# Patient Record
Sex: FEMALE | Race: OTHER | HISPANIC OR LATINO | URBAN - METROPOLITAN AREA
[De-identification: names, ages, dates, MRNs, and addresses within clinical notes are randomized per-mention and may not be internally consistent; named-entity substitution may affect disease eponyms.]

---

## 2020-07-12 ENCOUNTER — EMERGENCY (EMERGENCY)
Facility: HOSPITAL | Age: 30
LOS: 0 days | Discharge: HOME | End: 2020-07-12
Attending: EMERGENCY MEDICINE
Payer: MEDICAID

## 2020-07-12 VITALS
SYSTOLIC BLOOD PRESSURE: 131 MMHG | HEART RATE: 89 BPM | RESPIRATION RATE: 18 BRPM | DIASTOLIC BLOOD PRESSURE: 86 MMHG | TEMPERATURE: 98 F | OXYGEN SATURATION: 100 %

## 2020-07-12 VITALS — HEIGHT: 55 IN | WEIGHT: 128.97 LBS

## 2020-07-12 DIAGNOSIS — R11.0 NAUSEA: ICD-10-CM

## 2020-07-12 DIAGNOSIS — Z88.0 ALLERGY STATUS TO PENICILLIN: ICD-10-CM

## 2020-07-12 DIAGNOSIS — E04.1 NONTOXIC SINGLE THYROID NODULE: ICD-10-CM

## 2020-07-12 DIAGNOSIS — R07.0 PAIN IN THROAT: ICD-10-CM

## 2020-07-12 DIAGNOSIS — M54.2 CERVICALGIA: ICD-10-CM

## 2020-07-12 DIAGNOSIS — Z88.1 ALLERGY STATUS TO OTHER ANTIBIOTIC AGENTS STATUS: ICD-10-CM

## 2020-07-12 PROCEDURE — 99283 EMERGENCY DEPT VISIT LOW MDM: CPT

## 2020-07-12 RX ORDER — IBUPROFEN 200 MG
600 TABLET ORAL ONCE
Refills: 0 | Status: DISCONTINUED | OUTPATIENT
Start: 2020-07-12 | End: 2020-07-12

## 2020-07-12 NOTE — ED STATDOCS - NSFOLLOWUPINSTRUCTIONS_ED_ALL_ED_FT
Nódulos tiroideos  Thyroid Nodule     Un nódulo tiroideo es un crecimiento aislado de células tiroideas que jordan un bulto en la glándula tiroidea. La glándula tiroidea tiene forma de mallory. Está ubicada en la parte delantera inferior del rosana. Esta glándula envía mensajeros químicos (hormonas) a través de la juan daniel a todo el organismo. Estas hormonas son importantes en la regulación de la temperatura corporal y ayudan al cuerpo a usar la energía.  Los nódulos tiroideos son frecuentes. La mayoría no son cancerosos (benignos). Jose Luis persona puede tener mariano o varios nódulos.  Hay diferentes tipos de nódulos tiroideos, entre ellos nódulos que:  Crecen y se llenan de líquido (quistes tiroideos).Producen jose luis cantidad excesiva de hormona tiroidea (nódulos calientes o hipertiroideos).No producen hormona tiroidea (nódulos fríos o hipotiroideos).Se jordan a partir de células cancerosas (cáncer de tiroides).¿Cuáles son las causas?  En la mayoría de los casos, se desconoce la causa de esta afección.  ¿Qué incrementa el riesgo?  Los siguientes factores pueden hacer que sea más propenso a contraer esta afección:  Edad. Los nódulos tiroideos son más frecuentes en las personas mayores de 45 años.Sexo.  Los nódulos tiroideos benignos son más frecuentes en las mujeres.Los nódulos tiroideos cancerosos (malignos) son más frecuentes en los hombres.Antecedentes familiares que abarcan lo siguiente:  Nódulos tiroideos.Feocromocitoma.Carcinoma de tiroides.Hiperparatiroidismo.Determinados tipos de enfermedades tiroideas, danuta tiroiditis de Hashimoto.La falta de yodo en la dieta.Antecedentes de radiación en la tayo y en el rosana, por ejemplo, por un tratamiento previo para el cáncer.¿Cuáles son los signos o los síntomas?  En muchos casos no hay síntomas. Si tiene síntomas, estos pueden incluir los siguientes:  Un bulto en la parte inferior del rosana.Sensación de tener un bulto o un cosquilleo en la garganta.Dolor en el rosana, la mandíbula o el oído.Dificultad para tragar.Los nódulos calientes pueden causar algunos de los siguientes síntomas:  Pérdida de peso.Piel enrojecida y caliente.Sensación de calor.Nerviosismo.Latidos cardíacos acelerados.Los nódulos fríos pueden causar algunos de los siguientes síntomas:  Aumento de peso.Piel seca.Debilitamiento del ingrid. También puede presentarse con la caída del ingrid.Sensación de frío.Fatiga.Los nódulos tiroideos cancerosos pueden causar algunos de los siguientes síntomas:  Nódulos duros que parecen estar adheridos a la glándula tiroidea.Ronquera.Bultos en los ganglios cercanos a la tiroides (ganglios linfáticos).¿Cómo se diagnostica?  El médico puede palpar un nódulo tiroideo jai un examen físico. Esta afección también se puede diagnosticar en función de los síntomas. También pueden hacerle estudios, que incluyen los siguientes:  Ecografía. Se puede realizar para confirmar el diagnóstico.Biopsia. Implica yamila jose luis muestra del nódulo y examinarla con un microscopio.Análisis de juan daniel para saber si la tiroides funciona correctamente.Gammagrafía tiroidea. En esta prueba se utiliza un marcador radioactivo que se inyecta en jose luis vena para crear jose luis imagen de la glándula tiroidea en jose luis pantalla de computadora.Pruebas de diagnóstico por imágenes, danuta resonancia magnética (RM) o exploración por tomografía computarizada (TC). Estas se pueden realizar si:  El nódulo es dorinda.El nódulo está obstruyendo las vías respiratorias.Se sospecha la presencia de cáncer.¿Cómo se trata?  El tratamiento depende de la causa y del tamaño del o de los nódulos. Si el nódulo es estella, rodney vez no se necesite tratamiento. El médico puede controlar el nódulo para saber si desaparece sin tratamiento. Si el nódulo sigue creciendo, es canceroso o no desaparece, rodney vez se necesite tratamiento. El tratamiento puede incluir:  Drenar un nódulo quístico con jose luis aguja.Terapia de ablación. En marítnez tratamiento, se inyecta alcohol en la haja del nódulo para destruir las células. También puede usarse ablación con calor (ablación térmica).Yodo radiactivo. En martínez tratamiento, se administra yodo radiactivo en forma de comprimido o líquido que se nanette. Esta sustancia hace que el nódulo en la glándula tiroidea se reduzca.Cirugía para extirpar el nódulo. Puede que también haya que extirpar jose luis parte o la totalidad de la glándula tiroidea.Medicamentos.Siga estas indicaciones en still casa:  Esté atento a cualquier cambio en el nódulo.[place] los medicamentos de venta jameel y los recetados solamente danuta se lo haya indicado el médico.Concurra a todas las visitas de control danuta se lo haya indicado el médico. [place] es importante.Comuníquese con un médico si:  Le cambia la voz.Tiene dificultad para tragar.Siente dolor en el rosana, el oído o la mandíbula que se vuelve más intenso.El nódulo se agranda.El nódulo empieza a dificultarle la respiración.Parece que los músculos se le están encogiendo (pérdida de la masa muscular).Solicite ayuda inmediatamente si:  Siente dolor en el pecho.Pierde la conciencia.Tiene fiebre que aparece de repente.Se siente confundido.Ve u oye cosas que otras personas no zach ni oyen (tiene alucinaciones).Se siente muy débil.Tiene cambios en el estado de ánimo.Está muy inquieto.Tiene náuseas que aparecen de repente o vomita.Tiene diarrea súbitamente.Resumen  Un nódulo tiroideo es un crecimiento aislado de células tiroideas que jordan un bulto en la glándula tiroidea.Los nódulos tiroideos son frecuentes. La mayoría no son cancerosos (benignos). Jose Luis persona puede tener mariano o varios nódulos.El tratamiento depende de la causa y del tamaño del o de los nódulos. Si el nódulo es estella, rodney vez no se necesite tratamiento.El médico puede controlar el nódulo para saber si desaparece sin tratamiento. Si el nódulo sigue creciendo, es canceroso o no desaparece, rodney vez se necesite tratamiento.Esta información no tiene danuta fin reemplazar el consejo del médico. Asegúrese de hacerle al médico cualquier pregunta que tenga.

## 2020-07-12 NOTE — ED STATDOCS - PATIENT PORTAL LINK FT
You can access the FollowMyHealth Patient Portal offered by [facility] by registering at the following website: [email or web address]. By joining [facility]’s FollowMyHealth portal, you will also be able to view your health information using other applications (apps) compatible with our system.

## 2020-07-12 NOTE — ED STATDOCS - PROGRESS NOTE DETAILS
pt has a f/u appt scheduled for tmrw, will send pain control and d/c home with outpt f/u tmrw  Luna Gutierrez PA-C

## 2020-07-12 NOTE — ED ADULT TRIAGE NOTE - CHIEF COMPLAINT QUOTE
pt c c/o neck pain and swelling  sicne friday,. pt seen at  [facility] and diagnosed with cystic  jennyfer thyroid  nodule an hyperthyroidism. pt states the pain and swelling has worseed. pt denies respiratory invovlment or fever

## 2020-07-12 NOTE — ED ADULT NURSE NOTE - OBJECTIVE STATEMENT
Patient presents to ED complaining of throat pain. pt c c/o neck pain and swelling  since Friday, new onset of R sided pain. pt seen at  [facility] and diagnosed with cystic thyroid  nodule an hyperthyroidism. pt states the pain and swelling has worseed. pt denies respiratory invovlment or fever. Patient complaining of nausea

## 2020-07-12 NOTE — ED STATDOCS - OBJECTIVE STATEMENT
30 YOF PMHx thyroid nodule presents to the ED c/o CP and throat pain since 07/10. Pt had outpt evaluation at the [facility] and with specialist Dr. Gonzalez with diagnosis of subclinical hypothyroidism and left thyroid nodule in 03/2020, has another appointment with her Dr. Gonzalez tomorrow. Pt says her CP has been present for a while, had new onset of right sided throat pain on 07/10 which is made worse with swallowing. Denies fever. Associated with nausea. Pt presenting today d/t pain, says she is taking 600 mg Ibuprofen without relief of pain. Pharmacy: [facility]. 30 YOF PMHx thyroid nodule presents to the ED c/o throat pain since 07/10. Pt had outpt evaluation at the [facility] and with specialist Dr. Gonzalez with diagnosis of subclinical hypothyroidism and left thyroid nodule in 03/2020, has another appointment with her Dr. Gonzalez tomorrow. Pt has had neck pain for a while, has felt new onset of right sided throat pain on 07/10 which is made worse with swallowing. Denies fever. Associated with nausea. Pt presenting today d/t pain, says she is taking 600 mg Ibuprofen without relief of pain. Pharmacy: [facility].

## 2020-07-12 NOTE — ED STATDOCS - ENMT, MLM
Nasal mucosa clear.  Mouth with normal mucosa  +mild thyroid TTP, no goiter, no overlying redness, nml rom neck, oropharynx is nml

## 2020-07-12 NOTE — ED STATDOCS - CLINICAL SUMMARY MEDICAL DECISION MAKING FREE TEXT BOX
pt has f/u appt scheduled for tmrw will d/c home with pain control Pt already with outpatient workup, no clinical signs of thyrotoxicosis.  pt has f/u appt scheduled for tmrw will d/c home with pain control

## 2020-07-12 NOTE — ED ADULT NURSE NOTE - NSIMPLEMENTINTERV_GEN_ALL_ED
No Implemented All Universal Safety Interventions:  [place] to call system. Call bell, personal items and telephone within reach. Instruct patient to call for assistance. Room bathroom lighting operational. Non-slip footwear when patient is off stretcher. Physically safe environment: no spills, clutter or unnecessary equipment. Stretcher in lowest position, wheels locked, appropriate side rails in place.

## 2020-07-12 NOTE — ED ADULT NURSE NOTE - CHIEF COMPLAINT QUOTE
pt c c/o neck pain and swelling  sicne friday,. pt seen at  [facility] and diagnosed with cystic  jennyfer thyroid  nodule an hyperthyroidism. pt states the pain and swelling has worseed. pt denies respiratory invovlment or fever

## 2021-11-17 ENCOUNTER — APPOINTMENT (OUTPATIENT)
Dept: RHEUMATOLOGY | Facility: CLINIC | Age: 31
End: 2021-11-17

## 2022-10-19 PROBLEM — Z00.00 ENCOUNTER FOR PREVENTIVE HEALTH EXAMINATION: Status: ACTIVE | Noted: 2022-10-19
